# Patient Record
Sex: MALE | Race: WHITE | NOT HISPANIC OR LATINO | Employment: FULL TIME | URBAN - METROPOLITAN AREA
[De-identification: names, ages, dates, MRNs, and addresses within clinical notes are randomized per-mention and may not be internally consistent; named-entity substitution may affect disease eponyms.]

---

## 2019-12-15 ENCOUNTER — HOSPITAL ENCOUNTER (EMERGENCY)
Facility: HOSPITAL | Age: 54
Discharge: HOME | End: 2019-12-16
Attending: EMERGENCY MEDICINE
Payer: COMMERCIAL

## 2019-12-15 DIAGNOSIS — R79.89 ELEVATED SERUM CREATININE: ICD-10-CM

## 2019-12-15 DIAGNOSIS — N20.0 NEPHROLITHIASIS: Primary | ICD-10-CM

## 2019-12-15 DIAGNOSIS — R06.02 SOB (SHORTNESS OF BREATH): ICD-10-CM

## 2019-12-15 LAB
ALBUMIN SERPL BCP-MCNC: 4.6 G/DL (ref 3.5–5.2)
ALP SERPL-CCNC: 50 U/L (ref 55–135)
ALT SERPL W/O P-5'-P-CCNC: 56 U/L (ref 10–44)
ANION GAP SERPL CALC-SCNC: 9 MMOL/L (ref 8–16)
AST SERPL-CCNC: 32 U/L (ref 10–40)
BACTERIA #/AREA URNS AUTO: NORMAL /HPF
BASOPHILS # BLD AUTO: 0.05 K/UL (ref 0–0.2)
BASOPHILS NFR BLD: 0.6 % (ref 0–1.9)
BILIRUB SERPL-MCNC: 0.3 MG/DL (ref 0.1–1)
BILIRUB UR QL STRIP: NEGATIVE
BUN SERPL-MCNC: 19 MG/DL (ref 6–20)
CALCIUM SERPL-MCNC: 9.9 MG/DL (ref 8.7–10.5)
CHLORIDE SERPL-SCNC: 105 MMOL/L (ref 95–110)
CLARITY UR REFRACT.AUTO: CLEAR
CO2 SERPL-SCNC: 27 MMOL/L (ref 23–29)
COLOR UR AUTO: YELLOW
CREAT SERPL-MCNC: 1.8 MG/DL (ref 0.5–1.4)
DIFFERENTIAL METHOD: ABNORMAL
EOSINOPHIL # BLD AUTO: 0.1 K/UL (ref 0–0.5)
EOSINOPHIL NFR BLD: 0.8 % (ref 0–8)
ERYTHROCYTE [DISTWIDTH] IN BLOOD BY AUTOMATED COUNT: 13.1 % (ref 11.5–14.5)
EST. GFR  (AFRICAN AMERICAN): 47.9 ML/MIN/1.73 M^2
EST. GFR  (NON AFRICAN AMERICAN): 41.4 ML/MIN/1.73 M^2
GLUCOSE SERPL-MCNC: 129 MG/DL (ref 70–110)
GLUCOSE UR QL STRIP: NEGATIVE
HCT VFR BLD AUTO: 45.1 % (ref 40–54)
HGB BLD-MCNC: 14.4 G/DL (ref 14–18)
HGB UR QL STRIP: NEGATIVE
IMM GRANULOCYTES # BLD AUTO: 0.03 K/UL (ref 0–0.04)
IMM GRANULOCYTES NFR BLD AUTO: 0.4 % (ref 0–0.5)
KETONES UR QL STRIP: NEGATIVE
LACTATE SERPL-SCNC: 2 MMOL/L (ref 0.5–2.2)
LEUKOCYTE ESTERASE UR QL STRIP: ABNORMAL
LIPASE SERPL-CCNC: 40 U/L (ref 4–60)
LYMPHOCYTES # BLD AUTO: 1.6 K/UL (ref 1–4.8)
LYMPHOCYTES NFR BLD: 19.1 % (ref 18–48)
MCH RBC QN AUTO: 29.3 PG (ref 27–31)
MCHC RBC AUTO-ENTMCNC: 31.9 G/DL (ref 32–36)
MCV RBC AUTO: 92 FL (ref 82–98)
MICROSCOPIC COMMENT: NORMAL
MONOCYTES # BLD AUTO: 0.6 K/UL (ref 0.3–1)
MONOCYTES NFR BLD: 7.2 % (ref 4–15)
NEUTROPHILS # BLD AUTO: 6 K/UL (ref 1.8–7.7)
NEUTROPHILS NFR BLD: 71.9 % (ref 38–73)
NITRITE UR QL STRIP: NEGATIVE
NRBC BLD-RTO: 0 /100 WBC
PH UR STRIP: 5 [PH] (ref 5–8)
PLATELET # BLD AUTO: 238 K/UL (ref 150–350)
PMV BLD AUTO: 9.1 FL (ref 9.2–12.9)
POTASSIUM SERPL-SCNC: 4.1 MMOL/L (ref 3.5–5.1)
PROT SERPL-MCNC: 7.9 G/DL (ref 6–8.4)
PROT UR QL STRIP: NEGATIVE
RBC # BLD AUTO: 4.92 M/UL (ref 4.6–6.2)
RBC #/AREA URNS AUTO: 3 /HPF (ref 0–4)
SODIUM SERPL-SCNC: 141 MMOL/L (ref 136–145)
SP GR UR STRIP: 1.02 (ref 1–1.03)
SQUAMOUS #/AREA URNS AUTO: 0 /HPF
URN SPEC COLLECT METH UR: ABNORMAL
WBC # BLD AUTO: 8.36 K/UL (ref 3.9–12.7)
WBC #/AREA URNS AUTO: 1 /HPF (ref 0–5)

## 2019-12-15 PROCEDURE — 96375 TX/PRO/DX INJ NEW DRUG ADDON: CPT

## 2019-12-15 PROCEDURE — 99285 EMERGENCY DEPT VISIT HI MDM: CPT | Mod: ,,, | Performed by: EMERGENCY MEDICINE

## 2019-12-15 PROCEDURE — 83605 ASSAY OF LACTIC ACID: CPT

## 2019-12-15 PROCEDURE — 63600175 PHARM REV CODE 636 W HCPCS: Performed by: PHYSICIAN ASSISTANT

## 2019-12-15 PROCEDURE — 96361 HYDRATE IV INFUSION ADD-ON: CPT

## 2019-12-15 PROCEDURE — 81001 URINALYSIS AUTO W/SCOPE: CPT

## 2019-12-15 PROCEDURE — 93005 ELECTROCARDIOGRAM TRACING: CPT

## 2019-12-15 PROCEDURE — 93010 ELECTROCARDIOGRAM REPORT: CPT | Mod: ,,, | Performed by: INTERNAL MEDICINE

## 2019-12-15 PROCEDURE — 80053 COMPREHEN METABOLIC PANEL: CPT

## 2019-12-15 PROCEDURE — 83690 ASSAY OF LIPASE: CPT

## 2019-12-15 PROCEDURE — 96374 THER/PROPH/DIAG INJ IV PUSH: CPT

## 2019-12-15 PROCEDURE — 99285 PR EMERGENCY DEPT VISIT,LEVEL V: ICD-10-PCS | Mod: ,,, | Performed by: EMERGENCY MEDICINE

## 2019-12-15 PROCEDURE — 93010 EKG 12-LEAD: ICD-10-PCS | Mod: ,,, | Performed by: INTERNAL MEDICINE

## 2019-12-15 PROCEDURE — 85025 COMPLETE CBC W/AUTO DIFF WBC: CPT

## 2019-12-15 RX ORDER — TAMSULOSIN HYDROCHLORIDE 0.4 MG/1
0.8 CAPSULE ORAL
Status: DISCONTINUED | OUTPATIENT
Start: 2019-12-16 | End: 2019-12-16

## 2019-12-15 RX ORDER — MORPHINE SULFATE 4 MG/ML
8 INJECTION, SOLUTION INTRAMUSCULAR; INTRAVENOUS
Status: COMPLETED | OUTPATIENT
Start: 2019-12-15 | End: 2019-12-15

## 2019-12-15 RX ORDER — ONDANSETRON 2 MG/ML
8 INJECTION INTRAMUSCULAR; INTRAVENOUS
Status: COMPLETED | OUTPATIENT
Start: 2019-12-15 | End: 2019-12-15

## 2019-12-15 RX ORDER — TAMSULOSIN HYDROCHLORIDE 0.4 MG/1
0.8 CAPSULE ORAL
Status: DISCONTINUED | OUTPATIENT
Start: 2019-12-16 | End: 2019-12-15

## 2019-12-15 RX ORDER — KETOROLAC TROMETHAMINE 30 MG/ML
10 INJECTION, SOLUTION INTRAMUSCULAR; INTRAVENOUS
Status: COMPLETED | OUTPATIENT
Start: 2019-12-15 | End: 2019-12-15

## 2019-12-15 RX ADMIN — ONDANSETRON 8 MG: 2 INJECTION INTRAMUSCULAR; INTRAVENOUS at 09:12

## 2019-12-15 RX ADMIN — KETOROLAC TROMETHAMINE 10 MG: 30 INJECTION, SOLUTION INTRAMUSCULAR; INTRAVENOUS at 09:12

## 2019-12-15 RX ADMIN — SODIUM CHLORIDE 1000 ML: 0.9 INJECTION, SOLUTION INTRAVENOUS at 09:12

## 2019-12-15 RX ADMIN — MORPHINE SULFATE 8 MG: 4 INJECTION INTRAVENOUS at 10:12

## 2019-12-16 VITALS
DIASTOLIC BLOOD PRESSURE: 58 MMHG | WEIGHT: 192 LBS | OXYGEN SATURATION: 96 % | HEART RATE: 80 BPM | RESPIRATION RATE: 17 BRPM | TEMPERATURE: 98 F | HEIGHT: 67 IN | BODY MASS INDEX: 30.13 KG/M2 | SYSTOLIC BLOOD PRESSURE: 124 MMHG

## 2019-12-16 PROCEDURE — 63600175 PHARM REV CODE 636 W HCPCS: Performed by: PHYSICIAN ASSISTANT

## 2019-12-16 PROCEDURE — 99284 EMERGENCY DEPT VISIT MOD MDM: CPT | Mod: 25

## 2019-12-16 PROCEDURE — 96361 HYDRATE IV INFUSION ADD-ON: CPT

## 2019-12-16 PROCEDURE — 25000003 PHARM REV CODE 250: Performed by: PHYSICIAN ASSISTANT

## 2019-12-16 RX ORDER — ONDANSETRON 4 MG/1
4 TABLET, ORALLY DISINTEGRATING ORAL EVERY 6 HOURS PRN
Qty: 15 TABLET | Refills: 0 | Status: SHIPPED | OUTPATIENT
Start: 2019-12-16

## 2019-12-16 RX ORDER — TAMSULOSIN HYDROCHLORIDE 0.4 MG/1
0.8 CAPSULE ORAL
Status: COMPLETED | OUTPATIENT
Start: 2019-12-16 | End: 2019-12-16

## 2019-12-16 RX ORDER — MORPHINE SULFATE 15 MG/1
15 TABLET ORAL
Status: COMPLETED | OUTPATIENT
Start: 2019-12-16 | End: 2019-12-16

## 2019-12-16 RX ORDER — MORPHINE SULFATE 15 MG/1
15 TABLET ORAL EVERY 4 HOURS PRN
Qty: 12 TABLET | Refills: 0 | Status: SHIPPED | OUTPATIENT
Start: 2019-12-16

## 2019-12-16 RX ORDER — DOCUSATE SODIUM 100 MG/1
100 CAPSULE, LIQUID FILLED ORAL 2 TIMES DAILY
Qty: 60 CAPSULE | Refills: 0 | Status: SHIPPED | OUTPATIENT
Start: 2019-12-16

## 2019-12-16 RX ORDER — TAMSULOSIN HYDROCHLORIDE 0.4 MG/1
0.4 CAPSULE ORAL DAILY
Qty: 30 CAPSULE | Refills: 0 | Status: SHIPPED | OUTPATIENT
Start: 2019-12-16 | End: 2020-01-15

## 2019-12-16 RX ADMIN — MORPHINE SULFATE 15 MG: 15 TABLET ORAL at 12:12

## 2019-12-16 RX ADMIN — SODIUM CHLORIDE 500 ML: 0.9 INJECTION, SOLUTION INTRAVENOUS at 12:12

## 2019-12-16 RX ADMIN — TAMSULOSIN HYDROCHLORIDE 0.8 MG: 0.4 CAPSULE ORAL at 12:12

## 2019-12-16 NOTE — DISCHARGE INSTRUCTIONS
Your CT scan today showed a 6 mm kidney stone. You will most likely pass this on your own over the next day or so.  Please strain your urine and try to catch the stone.    I am prescribing medication for nausea and pain that you can take as needed.  I am also prescribing a stool softener because the morphine will make you constipated.  You should also take Tylenol, you can take up to 3 grams daily which is 6 of the 500 mg extra strength tablets for pain. Do not drive or drink alcohol while taking morphine.  I am also prescribing Flomax which will help open up your ureters.    Please schedule a follow-up appointment with Urology.  I have placed an ambulatory referral to their clinic.  If you start to have uncontrollable pain, persistent vomiting or fever greater than 100.4° F, please return to the emergency department.

## 2019-12-16 NOTE — ED PROVIDER NOTES
Encounter Date: 12/15/2019       History     Chief Complaint   Patient presents with    Abdominal Pain     Pt c/o worsening right sided abd pain x few days. Pt just had dinner and it became severe. He has been belching and pain is getting better. Denies N/V/D. Last BM this morning.      54-year-old male with no known past medical history presents for sudden onset of severe right lower quadrant abdominal pain 1 hr PTA.  The patient reports that he had some pain in his right flank several days ago which was mild, however while he was at dinner tonight he started to have severe squeezing intermittent pain in his right lower quadrant.  Pain is nonradiating, he denies any palliating factors he took a bowel movement which not improve his symptoms. He denies any provoking symptoms, no worsening with movement or eating.  He feels as if he cannot get comfortable.  He reports associated nausea.  He denies vomiting, is in bowel movements, urinary symptoms, chest pain, shortness of breath, numbness/tingling or weakness. No prior abdominal surgeries.        Review of patient's allergies indicates:  No Known Allergies  History reviewed. No pertinent past medical history.  History reviewed. No pertinent surgical history.  History reviewed. No pertinent family history.  Social History     Tobacco Use    Smoking status: Never Smoker    Smokeless tobacco: Never Used   Substance Use Topics    Alcohol use: Never     Frequency: Never    Drug use: Never     Review of Systems   Constitutional: Negative for fever.   HENT: Negative for sore throat.    Respiratory: Negative for shortness of breath.    Cardiovascular: Negative for chest pain.   Gastrointestinal: Positive for abdominal pain and nausea. Negative for anal bleeding, blood in stool, constipation, diarrhea, rectal pain and vomiting.   Genitourinary: Negative for dysuria, penile swelling, scrotal swelling and testicular pain.   Musculoskeletal: Negative for back pain and  myalgias.   Skin: Negative for rash.   Neurological: Negative for weakness, light-headedness and numbness.   Hematological: Does not bruise/bleed easily.       Physical Exam     Initial Vitals [12/15/19 2013]   BP Pulse Resp Temp SpO2   (!) 152/82 88 18 98.4 °F (36.9 °C) 97 %      MAP       --         Physical Exam    Nursing note and vitals reviewed.  Constitutional: He appears well-developed and well-nourished. He is not diaphoretic. He appears distressed.   Pacing around exam room groaning in pain. Family member at bedside   HENT:   Head: Normocephalic and atraumatic.   Eyes: EOM are normal. Pupils are equal, round, and reactive to light.   Neck: Normal range of motion. Neck supple.   Cardiovascular: Normal rate, regular rhythm, normal heart sounds and intact distal pulses. Exam reveals no gallop and no friction rub.    No murmur heard.  Pulmonary/Chest: Breath sounds normal. No respiratory distress. He has no wheezes. He has no rales. He exhibits no tenderness.   Abdominal: Soft. Normal appearance and bowel sounds are normal. He exhibits no distension and no mass. There is no tenderness. There is no rebound and no guarding.   Musculoskeletal: Normal range of motion.   Neurological: He is alert and oriented to person, place, and time.   Skin: Skin is warm and dry.   Psychiatric: He has a normal mood and affect.         ED Course   Procedures  Labs Reviewed   URINALYSIS, REFLEX TO URINE CULTURE - Abnormal; Notable for the following components:       Result Value    Leukocytes, UA Trace (*)     All other components within normal limits    Narrative:     Preferred Collection Type->Urine, Clean Catch   CBC W/ AUTO DIFFERENTIAL - Abnormal; Notable for the following components:    Mean Corpuscular Hemoglobin Conc 31.9 (*)     MPV 9.1 (*)     All other components within normal limits   COMPREHENSIVE METABOLIC PANEL - Abnormal; Notable for the following components:    Glucose 129 (*)     Creatinine 1.8 (*)     Alkaline  Phosphatase 50 (*)     ALT 56 (*)     eGFR if  47.9 (*)     eGFR if non  41.4 (*)     All other components within normal limits   URINALYSIS MICROSCOPIC    Narrative:     Preferred Collection Type->Urine, Clean Catch   LACTIC ACID, PLASMA   LIPASE        ECG Results          EKG 12-lead (Final result)  Result time 12/16/19 13:54:36    Final result by Interface, Lab In [facility] (12/16/19 13:54:36)                 Narrative:    Test Reason : SOB    Vent. Rate : 094 BPM     Atrial Rate : 094 BPM     P-R Int : 168 ms          QRS Dur : 090 ms      QT Int : 340 ms       P-R-T Axes : 049 124 -17 degrees     QTc Int : 425 ms    Normal sinus rhythm  Right axis deviation  Nonspecific T wave abnormality    Abnormal ECG  No previous ECGs available  Confirmed by ROBIN WARD MD ([id number]) on 12/16/2019 1:54:28 PM    Referred By: [email or web address]   SELF           Confirmed By:ROBIN WARD MD                            Imaging Results          CT Renal Stone Study ABD Pelvis WO (Final result)  Result time 12/15/19 23:42:00    Final result by Jayant Rollins MD (12/15/19 23:42:00)                 Impression:      1.  Moderate right-sided hydroureteronephrosis secondary to a 0.6 cm calculus within the mid right ureter.    2.  Additional punctate nonobstructing right renal calculus.    3.  Mildly distended loops of proximal small bowel with mild wall thickening, slightly greater than expected.  No discrete transition point identified.  Findings may relate to a nonspecific enteritis.  Clinical correlation is advised.    4.  Diverticulosis without evidence of acute diverticulitis.    5.  Large hiatal hernia.  Fat containing periumbilical hernia.    6.  Prostatomegaly.      Electronically signed by: Jayant Rollins MD  Date:    12/15/2019  Time:    23:42             Narrative:    EXAMINATION:  CT RENAL STONE STUDY ABD PELVIS WO    CLINICAL HISTORY:  Flank pain, stone disease suspected;RLQ pain, stone  suspected;    TECHNIQUE:  Low dose axial images, sagittal and coronal reformations were obtained from the lung bases to the pubic symphysis.  Contrast was not administered.    COMPARISON:  None    FINDINGS:  The lung bases are unremarkable.  There is no pleural fluid present.  The visualized portions of the heart appear normal.    The liver is unremarkable in appearance on this limited non-contrast examination.  The gallbladder shows no evidence of stones or pericholecystic fluid.  There is no intra-or extrahepatic biliary ductal dilatation.    There is a large hiatal hernia.  The stomach otherwise appears within normal limits.  The spleen, pancreas, and adrenal glands are unremarkable.    There is moderate right-sided hydroureteronephrosis secondary to a 0.6 cm calculus within the mid right ureter.  There is an additional punctate nonobstructing right renal calculus.  The left kidney demonstrates no evidence of hydronephrosis or nephrolithiasis.  No definite left-sided ureteral calculi identified.  The urinary bladder demonstrates no significant abnormality. The prostate appears enlarged measuring 5.1 cm.    The abdominal aorta is normal in course and caliber without significant atherosclerotic calcifications.There are a few mildly distended loops of proximal small bowel within the left upper abdomen with mild wall thickening.  No discrete transition point identified this could relate to a nonspecific enteritis. The colon appears within normal limits.  There is diverticulosis without evidence of acute diverticulitis.  The appendix is unremarkable.  There is a fat containing periumbilical hernia.  There is no ascites, portal venous gas, or free intraperitoneal air.    The osseous structures demonstrate degenerative changes of the spine.  The extraperitoneal soft tissues are unremarkable.                                 Medical Decision Making:   History:   Old Medical Records: I decided to obtain old medical  records.  Initial Assessment:   54-year-old male presenting for sudden onset of severe right lower quadrant abdominal pain. On ED arrival, he is mildly hypertensive otherwise normal vitals.  He appears distress secondary to pain and is pacing around the exam room.  He has no abdominal tenderness. 2+ bilateral radial and DP pulses.  Differential Diagnosis:   Nephrolithiasis  Pyelonephritis  Muscle strain  Lower suspicion for appendicitis given sudden onset and no abdominal tenderness  I considered, however doubt aortic dissection given 2+ peripheral pulses    Clinical Tests:   Lab Tests: Ordered and Reviewed  Radiological Study: Ordered and Reviewed  Medical Tests: Ordered and Reviewed  ED Management:  Will check labs, give fluids, Toradol, Zofran and reassess.    Patient appears considerably more comfortable after Toradol states he has some improvement in his pain. Will give morphine.  Labs notable for elevated creatinine at 1.8.  Unsure of this is chronic.  UA with 1 WBC, 3 RBCs.    Patient reports relief of pain after morphine.  CT shows 6 mm stone in the mid right ureter with moderate right-sided hydroureteronephrosis.  Will give Flomax.    Patient is tolerating p.o. intake.  Given his clinical improvement and reassuring workup, do not believe he requires further ED treatment is stable for discharge. Will discharge with Flomax, MSIR, Zofran and ambulatory referral to Urology. Stressed the importance of follow-up, strict ED return precautions given.  Patient voiced understanding and is comfortable with discharge. I discussed this patient with my supervising physician.    Darcie Stauffer PA-C                Attending Attestation:     Physician Attestation Statement for NP/PA:   I have conducted a face to face encounter with this patient in addition to the NP/PA, due to Medical Complexity    Other NP/PA Attestation Additions:    History of Present Illness: 55 y/o M with rt flank pain for the past few days  worsened today and today radiating around to RLQ. No fevers.  Nausea. No emesis. No dysuria.   Physical Exam: abd soft NT/ND, no r/g   Medical Decision Makin53 y/o M with renal colic on CTscan with 6mm RT mi ureteral stone.  Pain controlled at time of discharge.  Discharge with pain meds, flomax, urine strainer and urology follow up. Routine return precautions provided.                 ED Course as of Dec 17 1257   Sun Dec 15, 2019   2249 Creatinine elevated to 1.8.  Unclear if this is chronic or new.  No history of kidney disease.   Comprehensive metabolic panel(!) [CC]   Mon Dec 16, 2019   0000 CT shows 6 mm stone with moderate hydronephrosis   CT Renal Stone Study ABD Pelvis WO [CC]      ED Course User Index  [CC] Darcie Stauffer PA-C                Clinical Impression:       ICD-10-CM ICD-9-CM   1. Nephrolithiasis N20.0 592.0   2. SOB (shortness of breath) R06.02 786.05   3. Elevated serum creatinine R79.89 790.99         Disposition:   Disposition: Discharged  Condition: Stable        Darcie Stauffer PA-C  [phone]       Denise Tamayo MD  [phone]

## 2019-12-16 NOTE — ED TRIAGE NOTES
Patient comes into ER with complaints of abdominal pain after eating dinner tongiht. Patient denies N/V/D.